# Patient Record
Sex: FEMALE | Employment: OTHER | URBAN - METROPOLITAN AREA
[De-identification: names, ages, dates, MRNs, and addresses within clinical notes are randomized per-mention and may not be internally consistent; named-entity substitution may affect disease eponyms.]

---

## 2023-04-11 ENCOUNTER — TELEPHONE (OUTPATIENT)
Dept: CARDIOLOGY CLINIC | Facility: HOSPITAL | Age: 88
End: 2023-04-11

## 2023-04-20 DIAGNOSIS — I35.0 AORTIC STENOSIS: Primary | ICD-10-CM

## 2023-05-18 ENCOUNTER — HOSPITAL ENCOUNTER (OUTPATIENT)
Dept: CARDIOLOGY CLINIC | Facility: HOSPITAL | Age: 88
Discharge: HOME | End: 2023-05-18
Attending: INTERNAL MEDICINE
Payer: MEDICARE

## 2023-05-18 ENCOUNTER — HOSPITAL ENCOUNTER (OUTPATIENT)
Dept: CV DIAGNOSTICS | Facility: HOSPITAL | Age: 88
Discharge: HOME | End: 2023-05-18
Attending: INTERNAL MEDICINE
Payer: MEDICARE

## 2023-05-18 DIAGNOSIS — I35.0 AORTIC STENOSIS: ICD-10-CM

## 2023-05-18 PROCEDURE — 93306 TTE W/DOPPLER COMPLETE: CPT | Performed by: INTERNAL MEDICINE

## 2023-05-18 PROCEDURE — 99212 OFFICE O/P EST SF 10 MIN: CPT

## 2023-05-18 NOTE — PROGRESS NOTES
[address] [facility] [facility] [address], [address]  [id number]            2023    TO: Dr. Anita Griffin    RE: Can Vargas   : 1935    I had the opportunity to see your patient, Sudeep Lee in the [facility] heart center today for evaluation for aortic valve replacement. As you know, she is an 15-year-old female who was referred to you as a second opinion ,as she was initially worked up for TAVR at [facility], but her daughter works here at [facility] and preferred her to come here. She has known moderate aortic stenosis and moderate AI-and has been followed clinically with serial echocardiograms. Over the past 3 to 6 months she has noticed increasing exertional dyspnea and fatigue. She has intermittent lightheadedness, but denies syncope, near syncope. She currently denies CP, palpitations, SOB, PND, orthopnea. The most recent echocardiogram data from 10/7/2022 revealed: AMBROSIO 0.9, mean gradient 14 mmHg, peak 24 mmHg, peak velocity 2.5M/S, EF 50%, mild AI, and trivial TR. She has not had a left or right heart catheterization. The patient's past medical history includes, but is not limited to: Bilateral breast CA s/p bilateral mastectomy/XRT/chemo, hypothyroidism, Parkinson's, and GERD. The patient's past surgical history includes, but is not limited to: Bilateral mastectomy , appendectomy, EZEQUIEL, bilateral TKA, left ureter surgery, esophageal repair post hernia surgery due to perforation -, left thoracotomy/decortication secondary to bilateral empyema due to complications from esophageal perforation at [facility], jejunostomy tube placement/reversal . The patient's family history is noncontributory. Patient social history includes: She is a retired teacher. She denies tobacco use, EtOH use, or illicit drug use.     The STS risk score is: 4%      Allergies:  Not on File    Medications:  Carbidopa 25 mg-levodopa 100 mg 3 times daily  Multivitamin daily  Anastrozole 1 mg daily  Alprazolam 0.25 mg as needed  Lansoprazole 30 mg daily  Escitalopram 5 mg daily      Review of Systems:   Constitutional: No fevers, chills, weight gain or loss.+ Fatigue  ENT: No vision changes, mouth pain, neck swelling  Skin: No masses, rashes or skin changes  Respiratory: Denies cough, or wheezing. + SOB/MATA  CV: Denies chest pain, palpitations, orthopnea, PND. + Dizziness  Musculoskeletal: No joint pain, stiffness or swelling. GI: No nausea, vomiting or diarrhea. No blood in stools. Neurologic: No seizures, tremors, weakness or numbness. On physical examination, the patient is a well developed, well nourished female, oriented times three in no apparent distress. Eyes:  sclerae which are anicteric. PERRL  ENT:  Trachea is in the midline. There is no neck mass. Lungs: clear bilaterally without rales, rhonchi or wheezes. Cardiac: RRR, S1, S2, 2/6 MICHAEL  Abdomen is without organomegaly. No aortic enlargement is detected. Extremities reveal pulses which are equal bilaterally. No venous stasis changes, arterial ulcerations, or edema. Skin reveals no pigmented lesions or rashes. Neurologic examination is normal without focal deficits. Musculoskeletal demonstrates normal range of motion and strength. Impression:  81year old female with moderate-severe, symptomatic aortic stenosis. We discussed the nature of aortic stenosis as well as the treatment options, including: Medical management, TAVR, and SAVR. Utilizing patient data, she is found to have an STS risk score of 4% for mortality with surgical AVR. I had a long discussion with the patient regarding indications for valve replacement. We discussed SAVR and TAVR.   In regards to SAVR, we discussed the operative approach, debridement of valve, hospital stay 3-5 days, total recovery time 6-8 weeks and complications of surgery including but not limited to: bleeding, infection, arrhythmia, organ dysfunction and death. We discussed bioprosthetic and mechanical valves, anticoagulation with each and longevity rates, need for possible reintervention in the future. In regards to TAVR, we discussed approach, hospital stay 1-2 days, recovery time 1-2 weeks and complications of TAVR including but not limited to: Infection, femoral artery injury needing repair, aortic complication requiring sternotomy, heart block that my or may not require pacemaker. We discussed the uncertain durability of TAVR valves. We discussed longevity rates of valve and anticoagulation. At age 81, with STS risk score of  4%, and the above mentioned co morbidities -specifically bilateral breast CA with mastectomy/XRT, complicated esophageal perforation/prior left thoracotomy, I feel a transcatheter approach would be the best option, if technically feasible. She would be a prohibitive risk for SAVR. She will need a repeat echocardiogram today as her last one was over a year ago, and she was only moderate AS at that time, and her symptoms have clearly progressed. After our discussusion, I feel they have a good understanding, and wish to proceed with TAVR. They will complete necessary testing today, including Gated CTA chest/abd/pelvis, echo, and carotid US -with results to be reviewed by the multidisciplinary structural heart committee, with final plans/timing to be determined. If her coronaries are unable to be clearly seen on CTA, she will need to undergo left heart catheterization. Of note per her ENT/GI physicians at [facility] due to complex repair of her esophagus/lungs she should not undergo TIA during procedure. Please see note/care everywhere for further details. Thank you for letting me see your patient in consultation.      Briana Starkey, APRN  5/18/2023  [id number]

## 2023-05-25 ENCOUNTER — TELEPHONE (OUTPATIENT)
Dept: CARDIOLOGY CLINIC | Facility: HOSPITAL | Age: 88
End: 2023-05-25